# Patient Record
Sex: FEMALE | Race: WHITE | URBAN - METROPOLITAN AREA
[De-identification: names, ages, dates, MRNs, and addresses within clinical notes are randomized per-mention and may not be internally consistent; named-entity substitution may affect disease eponyms.]

---

## 2024-08-01 ENCOUNTER — OFFICE VISIT (OUTPATIENT)
Dept: INFECTIOUS DISEASES | Age: 34
End: 2024-08-01
Payer: COMMERCIAL

## 2024-08-01 VITALS
WEIGHT: 153.4 LBS | HEART RATE: 58 BPM | OXYGEN SATURATION: 96 % | HEIGHT: 61 IN | SYSTOLIC BLOOD PRESSURE: 110 MMHG | DIASTOLIC BLOOD PRESSURE: 73 MMHG | TEMPERATURE: 97.9 F | BODY MASS INDEX: 28.96 KG/M2

## 2024-08-01 DIAGNOSIS — Z86.69 HISTORY OF FACIAL PALSY: ICD-10-CM

## 2024-08-01 DIAGNOSIS — Z86.19 HISTORY OF LYME DISEASE: Primary | ICD-10-CM

## 2024-08-01 DIAGNOSIS — Z86.19 HISTORY OF LYME DISEASE: ICD-10-CM

## 2024-08-01 DIAGNOSIS — E03.9 HYPOTHYROIDISM, UNSPECIFIED TYPE: ICD-10-CM

## 2024-08-01 LAB
ALBUMIN SERPL-MCNC: 4.9 G/DL (ref 3.4–5)
ALBUMIN/GLOB SERPL: 1.8 {RATIO} (ref 1.1–2.2)
ALP SERPL-CCNC: 105 U/L (ref 40–129)
ALT SERPL-CCNC: 20 U/L (ref 10–40)
ANION GAP SERPL CALCULATED.3IONS-SCNC: 13 MMOL/L (ref 3–16)
AST SERPL-CCNC: 17 U/L (ref 15–37)
BILIRUB SERPL-MCNC: 0.5 MG/DL (ref 0–1)
BUN SERPL-MCNC: 19 MG/DL (ref 7–20)
CALCIUM SERPL-MCNC: 9.8 MG/DL (ref 8.3–10.6)
CHLORIDE SERPL-SCNC: 100 MMOL/L (ref 99–110)
CO2 SERPL-SCNC: 27 MMOL/L (ref 21–32)
CREAT SERPL-MCNC: 0.8 MG/DL (ref 0.6–1.1)
GFR SERPLBLD CREATININE-BSD FMLA CKD-EPI: >90 ML/MIN/{1.73_M2}
GLUCOSE SERPL-MCNC: 82 MG/DL (ref 70–99)
POTASSIUM SERPL-SCNC: 4.3 MMOL/L (ref 3.5–5.1)
PROT SERPL-MCNC: 7.6 G/DL (ref 6.4–8.2)
SODIUM SERPL-SCNC: 140 MMOL/L (ref 136–145)

## 2024-08-01 PROCEDURE — 99204 OFFICE O/P NEW MOD 45 MIN: CPT | Performed by: INTERNAL MEDICINE

## 2024-08-01 RX ORDER — LEVOTHYROXINE SODIUM 0.03 MG/1
25 TABLET ORAL DAILY
COMMUNITY
Start: 2024-07-15

## 2024-08-01 ASSESSMENT — ENCOUNTER SYMPTOMS
NAUSEA: 0
WHEEZING: 0
COUGH: 0
SHORTNESS OF BREATH: 0
SINUS PAIN: 0
CONSTIPATION: 0
EYE REDNESS: 0
DIARRHEA: 0
SORE THROAT: 0
BACK PAIN: 0
EYE DISCHARGE: 0
ABDOMINAL PAIN: 0
RHINORRHEA: 0
SINUS PRESSURE: 0

## 2024-08-01 NOTE — PROGRESS NOTES
Infectious Diseases Oupatient Ne Patient Note            Reason for Visit:               Concern for Lyme disease  Primary Care Physician:  No, Pcp  History Obtained From:   Patient , Medical Records     CHIEF COMPLAINT:    Chief Complaint   Patient presents with    New Patient     Lyme Disease -nk  Ref[facility] by: [facility]       INTERVAL HISTORY: Katalina Meier is a 33 y.o. pleasant female patient, who had an outpatient visit with me today to establish care as a new patient for Lyme disease.    History was obtained from chart review and the patient. The patient had a in-person clinic visit with me today for above mentioned complaints.    The patient has history of weakness on the right side of the face that had started on June 20.  She had a tick bite on the side of her neck about a month before that and the tick was pulled off by her  reportedly.  The patient had the rash on the back afterwards without involvement of extremities or buccal mucosa.    The patient had traveled to the [facility] but that was after the rash had developed.    He went to ER at [facility] on 6/22/2024 and was prescribed a 3-week course of oral doxycycline 100 mg every 12 hours by ER physician along with high-dose of steroids of 60 mg daily for 1 week due to concern for fusion of falciform Lyme disease.    She had a Lyme disease serology done on 6/21/2024 at [facility] and Lyme disease IgM and IgG antibody was positive.  The patient recently went to the [facility] and was referred from the ER due to concern for Lyme disease.    She was seen today in the clinic as a new patient.        Past Medical History:   Past medical and surgical history was reviewed by me during this visit in detail.    No past medical history on file.    Past Surgical History:    No past surgical history on file.    Current Medications:   All medications were reviewed by me during this visit  Current Outpatient